# Patient Record
(demographics unavailable — no encounter records)

---

## 2025-05-22 NOTE — IMAGING
[de-identified] : Right thumb-there is no deformity or swelling appreciated.  He does have tenderness to palpation over the UCL distribution and has pain and opening to stress of the UCL.  X-rays of the right thumb-there are no fractures or avulsions appreciated

## 2025-05-22 NOTE — HISTORY OF PRESENT ILLNESS
[de-identified] : 05/22/2025: JAQUI FUCHS is a 16 year old Rt Hand dominant male complaining of Right Thumb pain.He is known to play football and lacrosse for [employer].  He notes the injury was while at football practice he jammed the right thumb trying to catch a pass.  Since that time he has had continued pain at the base of the thumb in the distribution of the UCL and has had pain and instability with pushing off   Patient states pain started: 4 weeks ago  Mechanism of Injury: Pt states he was playing football and jammed his right thumb into the football.  Pain scale at rest:  0/10 Pain scale with Activity:  5/10 Pain description: Sharp with activity. shooting pain.  Denies Numbness/tingling Denies Pain Radiation: No Prior Treatment   Additional details: Patient states he feels like he will reinjure his finger if her hyper extends it.   PMHx: No  Allergies: No

## 2025-05-22 NOTE — ASSESSMENT
[FreeTextEntry1] : -He is 4 weeks after jamming injury to the base of the right thumb.  He has taken anti-inflammatories and done conservative care including activity modification -Physical exam he has tenderness over the UCL and opening with stress to the ligament -We will get stat MRI to evaluate possible UCL tear he will follow-up after MRI with Dr. YESSI BRANDON for review and delineation of treatment plan

## 2025-05-23 NOTE — REASON FOR VISIT
[FreeTextEntry2] :  05/23/2025:  16 year old male here today for: new consult R thumb pain after he injured in football practice, he went to [facility] on 5/22/25 he had x-rays and an MRI on 5/22/25 at [facility]

## 2025-05-23 NOTE — ASSESSMENT
[FreeTextEntry1] : This is an acute uncomplicated injury that needs OT brace, NSIADS  I recommend the patient take over the counter medication such as Advil/Motrin/Aleve or Tylenol for pain and inflammation The patient was sent to OT for a custom splint as a splint/cast made in office is not the best immobilization option for this patient. This fracture needs OT custom splint immobilization to manage/care for this injury.  Will call for follow up

## 2025-05-23 NOTE — HISTORY OF PRESENT ILLNESS
[5] : 5 [4] : 4 [Dull/Aching] : dull/aching [de-identified] : R thumb injury trying to catch football 4 weeks ago Still has pain and swelling  For the first time I independently reviewed the MRI of the R thumb 5/22/25 [facility] The clinically relevant findings shows mild partial UCL tear; 1st MC edema [FreeTextEntry1] : R thumb